# Patient Record
Sex: MALE | URBAN - METROPOLITAN AREA
[De-identification: names, ages, dates, MRNs, and addresses within clinical notes are randomized per-mention and may not be internally consistent; named-entity substitution may affect disease eponyms.]

---

## 2020-09-16 ENCOUNTER — IMMUNIZATION (OUTPATIENT)
Dept: FAMILY MEDICINE | Facility: CLINIC | Age: 20
End: 2020-09-16
Payer: COMMERCIAL

## 2020-09-16 DIAGNOSIS — Z23 NEED FOR PROPHYLACTIC VACCINATION AND INOCULATION AGAINST INFLUENZA: Primary | ICD-10-CM

## 2020-09-16 PROCEDURE — 99207 ZZC NO CHARGE NURSE ONLY: CPT

## 2020-09-16 PROCEDURE — 90686 IIV4 VACC NO PRSV 0.5 ML IM: CPT

## 2020-09-16 PROCEDURE — 90471 IMMUNIZATION ADMIN: CPT

## 2021-03-24 ENCOUNTER — VIRTUAL VISIT (OUTPATIENT)
Dept: FAMILY MEDICINE | Facility: CLINIC | Age: 21
End: 2021-03-24
Payer: COMMERCIAL

## 2021-03-24 DIAGNOSIS — Z20.822 ENCOUNTER FOR LABORATORY TESTING FOR COVID-19 VIRUS: ICD-10-CM

## 2021-03-24 DIAGNOSIS — Z20.822 EXPOSURE TO 2019 NOVEL CORONAVIRUS: ICD-10-CM

## 2021-03-24 DIAGNOSIS — Z20.822 EXPOSURE TO 2019 NOVEL CORONAVIRUS: Primary | ICD-10-CM

## 2021-03-24 LAB
SARS-COV-2 RNA RESP QL NAA+PROBE: NORMAL
SPECIMEN SOURCE: NORMAL

## 2021-03-24 PROCEDURE — 99202 OFFICE O/P NEW SF 15 MIN: CPT | Mod: 95 | Performed by: NURSE PRACTITIONER

## 2021-03-24 PROCEDURE — 87635 SARS-COV-2 COVID-19 AMP PRB: CPT | Performed by: NURSE PRACTITIONER

## 2021-03-24 NOTE — PROGRESS NOTES
Damián is a 21 year old who is being evaluated via a billable telephone visit.      What phone number would you like to be contacted at? [phone]  How would you like to obtain your AVS?   Doesn't want    Assessment & Plan     Exposure to 2019 novel coronavirus  Exposure consistent with [facility] testing guidelines. This is day 5 after exposure. Discussed quarantining recommendations. Discussed process for scheduling COVID testing. Recommended ER visit if symptoms worsen and/or can't be managed at home.     - Asymptomatic COVID-19 Virus (Coronavirus) by PCR; Future    Encounter for laboratory testing for COVID-19 virus    - Asymptomatic COVID-19 Virus (Coronavirus) by PCR; Future         Patient Instructions   COVID testing ordered today. You will receive a call from a blocked number to schedule that appointment.     Please quarantine until you receive your results.     If the results are negative, you need to be fever free for 24 hours before ending quarantine.     If your results are negative, but you have had exposure with a COVID positive person, you need to quarantine for 7 days from the date of your last exposure to that person or 7 days from their last day of quarantine if unable to avoid exposure.     If results are positive, you need to quarantine for 10 days from start of symptoms AND you need to be fever free (without the use of fever reducing medications) for 24 hours before ending quarantine.     If results are positive, it is recommended that asymptomatic household contacts quarantine for 14 days, but CDC has recently approved only 10 days quarantine without a COVID test (while they still recommend the full 14 days).     If you develop worsening symptoms or difficulty breathing, please go to ER.        Return if symptoms worsen or fail to improve.    MELISSA Chavez [facility]    Ky Steel is a 21 year old who presents for the following health issues     HPI        Concern for COVID-19  About how many days ago did these symptoms start? no sypmtoms  Is this your first visit for this illness? Yes  In the 14 days before your symptoms started, have you had close contact with someone with COVID-19 (Coronavirus)? Yes, I have been in contact with someone who has COVID-19/Coronavirus (confirmed by lab test).  Do you have a fever or chills? No  Are you having new or worsening difficulty breathing? No  Do you have new or worsening cough? No  Have you had any new or unexplained body aches? No    Have you experienced any of the following NEW symptoms?    Headache: No    Sore throat: No    Loss of taste or smell: No    Chest pain: No    Diarrhea: No    Rash: No    Who do you live with? Parents, brother and 2 sisters  Are you, or a household member, a healthcare worker or a ? No  Do you live in a nursing home, group home, or shelter? No  Do you have a way to get food/medications if quarantined? Yes, I have a friend or family member who can help me.    Additional provider notes: Was in contact with someone on Friday who had a positive COVID test done on Thursday. Test came back over the weekend. Denies any symptoms.     Review of Systems   All other systems reviewed and are negative.           Objective         Vitals:  No vitals were obtained today due to virtual visit.    healthy, alert, no distress and cooperative  PSYCH: Alert and oriented times 3; coherent speech, normal   rate and volume, able to articulate logical thoughts, able   to abstract reason, no tangential thoughts, no hallucinations   or delusions  Her affect is normal and pleasant  RESP: No cough, no audible wheezing, able to talk in full sentences  Remainder of exam unable to be completed due to telephone visits            Phone call duration: 3 minutes

## 2021-03-24 NOTE — PATIENT INSTRUCTIONS
COVID testing ordered today. You will receive a call from a blocked number to schedule that appointment.     Please quarantine until you receive your results.     If the results are negative, you need to be fever free for 24 hours before ending quarantine.     If your results are negative, but you have had exposure with a COVID positive person, you need to quarantine for 7 days from the date of your last exposure to that person or 7 days from their last day of quarantine if unable to avoid exposure.     If results are positive, you need to quarantine for 10 days from start of symptoms AND you need to be fever free (without the use of fever reducing medications) for 24 hours before ending quarantine.     If results are positive, it is recommended that asymptomatic household contacts quarantine for 14 days, but CDC has recently approved only 10 days quarantine without a COVID test (while they still recommend the full 14 days).     If you develop worsening symptoms or difficulty breathing, please go to ER.

## 2021-03-25 LAB
LABORATORY COMMENT REPORT: NORMAL
SARS-COV-2 RNA RESP QL NAA+PROBE: NEGATIVE
SPECIMEN SOURCE: NORMAL